# Patient Record
Sex: MALE | Race: WHITE | Employment: FULL TIME | URBAN - METROPOLITAN AREA
[De-identification: names, ages, dates, MRNs, and addresses within clinical notes are randomized per-mention and may not be internally consistent; named-entity substitution may affect disease eponyms.]

---

## 2023-04-19 ENCOUNTER — HOSPITAL ENCOUNTER (OUTPATIENT)
Dept: PHYSICAL THERAPY | Age: 78
Discharge: HOME | End: 2023-04-19
Payer: MEDICARE

## 2023-04-19 PROCEDURE — 97110 THERAPEUTIC EXERCISES: CPT

## 2023-04-19 PROCEDURE — 97162 PT EVAL MOD COMPLEX 30 MIN: CPT

## 2023-04-19 PROCEDURE — 97140 MANUAL THERAPY 1/> REGIONS: CPT

## 2023-04-19 NOTE — PROGRESS NOTES
PT INITIAL EVALUATION NOTE 2-15    Patient Name: Farooq Kapadia  Date:2023  : 1945  [x]  Patient  Verified  Payor: BLUE CROSS / Plan: [employer] [facility] [place] / Product Type: PPO /    In time: 2:35 p  Out time:3:40 p  Total Treatment Time (min): 65  Visit #: 1     Treatment Area: Neck pain [M54.2]  Other low back pain [M54.59]    SUBJECTIVE  Pain Level (0-10 scale): Current- 3, Best- 0, Worst- 8    Any medication changes, allergies to medications, adverse drug reactions, diagnosis change, or new procedure performed?: [] No    [x] Yes (see summary sheet for update)  Subjective:     Chief complaint: Neck pain radiating to the shoulder blade B. Pain began in late January on a trip to [facility]. He reports that he sat in lounge chairs and he felt like he strained his neck trying to have conversations while holding his head up. Pain radiates to the shoulder/ mid scapular region. It is an aching and sharp pain and has stayed the same. He is L handed. Aggravating factors: reaching up, turning the ehad   Easing factors: Icy-hot and IB  Imaging/tests: none performed   Numbness/tingling: denies     PLOF: Kayaking and swimming without pain    Mechanism of Injury: Overuse   Previous Treatment/Compliance: Previous PT for L ankle  PMHx/Surgical Hx: macular degeneration and legally blind ***  Work Hx: Not currently working- enjoys walking, play basketball, ride his bike, swim, kayak   Living Situation: Stairs at home, pulling on handrails hurts his shoulder, aide 3x/week   Pt Goals: \"Swim, bike, kayak again. \"   Barriers: None noted   Motivation: Motivated   Substance use: None noted    Cognition: A & O x 4        OBJECTIVE/EXAMINATION  Posture:  mod forward head/rounded shoulders    Palpation: No TTP noted  Joint Mobility:   Cervical- min hypomobile   Thoracic- NT      Cervical AROM: Rests at 5 degrees flexion        R  L    Flexion    To chest         Extension   40        Side Bending   25  25, p!  To L          Rotation   25%, p! 50%, p!      Upper Extremity AROM:       R  L         Flexion    160  150  Abduction   145  155  External Rotation  WNL  WNL  Internal Rotation  WNL  WNL, P!    MMT:      R  L  Shoulder flexion  5  5  Shoulder abduction   5  5  Shoulder IR   5  4+  Shoulder ER   4+  4+    Sensation: Intact and equal bilaterally       Special Tests:    Cervical Compression: negative    Cervical Distraction: positive relief    Vertebral Artery test: NT   Spurling's test: negative        Modality rationale: decrease pain and increase tissue extensibility to improve the patients ability to turn his head, reach, perform ADL's    Min Type Additional Details    [] Estim: []Att   []Unatt        []TENS instruct                  []IFC  []Premod   []NMES                     []Other:  []w/US   []w/ice   []w/heat  Position:  Location:    []  Traction: [] Cervical       []Lumbar                       [] Prone          []Supine                       []Intermittent   []Continuous Lbs:  [] before manual  [] after manual  []w/heat    []  Ultrasound: []Continuous   [] Pulsed at:                            []1MHz   []3MHz Location:  W/cm2:    []  Paraffin         Location:  []w/heat   10 []  Ice     [x]  Heat- 2 towels  []  Ice massage Position: supine  Location: cervical     []  Laser  []  Other: Position:  Location:    []  Vasopneumatic Device Pressure:       [] lo [] med [] hi   Temperature:    [x] Skin assessment post-treatment:  [x]intact []redness- no adverse reaction    []redness - adverse reaction:     *** min Therapeutic Exercise:  [] See flow sheet :   Rationale: {BSHSI IMMOTION THER EX:84001:a} to improve the patients ability to ***    8 min Manual Therapy:  ***   Rationale: {BSHSI IMMOTION MANUAL THERAPY:29522:a}  to improve the patients ability to ***            With   [] TE   [] TA   [] Neuro   [] SC   [] other: Patient Education: [x] Review HEP    [] Progressed/Changed HEP based on:   [] positioning   [] body mechanics   [] transfers   [] heat/ice application    [] other:        Other Objective/Functional Measures: FOTO Functional Measure: 55/100                  Pain Level (0-10 scale) post treatment: ***      ASSESSMENT:      [x]  See Plan of Dyan Billy 27, DPT 4/19/2023

## 2023-04-20 NOTE — THERAPY EVALUATION
Physical Therapy at ,   a part of  [facility]  [place]  [facility] [place]  Phone: [phone]  Fax: [phone]    Plan of Care/Statement of Necessity for Physical Therapy Services  2-15    Patient name: Shanel Saleh  : 1945  Provider#: [id number]  Referral source: Referred, Self, MD      Medical/Treatment Diagnosis: Neck pain [M54.2]  Other low back pain [M54.59]     Prior Hospitalization: see medical history     Comorbidities: ***  Prior Level of Function: Kayaking and swimming without pain    Medications: Verified on Patient Summary List  Start of Care: 23      Onset Date:    The Plan of Care and following information is based on the information from the initial evaluation. Assessment/ key information: Patient is a pleasant 68year old male, accompanied by aide, presenting with cervical pain. Current symptoms limit functional ability to reach overhead when dressing, turn his head while grooming, or swim for general wellness and exercise. Marked deficits include cervical and B shoulder AROM restriction, cervical vertebrae hypomobility, and modest upper quarter hypertonicity. Patient will benefit from skilled PT to address all previously listed deficits. Evaluation Complexity History {PT OP History :62311}; Examination {PT OP Examination :83330} ; Presentation {PT OP Presentation :22147} ; Clinical Decision Making {PT OP Decision Making :87566}  Overall Complexity Rating: {PT OP Complexity:65356}    Problem List: {BSHSI IP PROBLEM LIST:97695:a}   Treatment Plan may include any combination of the following: {Outpt PT Treatment Plan:51541:a}  Patient / Family readiness to learn indicated by: {Outpt PT Patient Family Readiness to Learn:99219:a}  Persons(s) to be included in education: {IM Persons Education:73427}  Barriers to Learning/Limitations: {barriers to learning/limitations:24150}  Patient Goal (s): ***  Patient Self Reported Health Status: {Outpt PT Rehabilitation Potential:47214}  Rehabilitation Potential: {Outpt PT Rehabilitation Potential:11850}    Short Term Goals: To be accomplished in *** {BSHSI OPWEEKS/TREATMENTS:19956}:  ***  Long Term Goals: To be accomplished in *** {BSHSI OP WEEKS/TREATMENTS:19956}:  ***  Frequency / Duration: Patient to be seen *** times per week for *** {BSHSI OP WEEKS/TREATMENTS:19956}. Patient/ Caregiver education and instruction: {Outpt PT patient caregiver ed.:97769:a}    [x]  Plan of care has been reviewed with PTA        Certification Period: ***  Joceline Diaz DPT 4/20/2023     ________________________________________________________________________    I certify that the above Therapy Services are being furnished while the patient is under my care. I agree with the treatment plan and certify that this therapy is necessary.     [de-identified] Signature:____________________  Date:____________Time: _________         Martínez Castillo MD

## 2023-04-24 ENCOUNTER — HOSPITAL ENCOUNTER (OUTPATIENT)
Dept: PHYSICAL THERAPY | Age: 78
Discharge: HOME | End: 2023-04-24
Payer: MEDICARE

## 2023-04-24 PROCEDURE — 97140 MANUAL THERAPY 1/> REGIONS: CPT

## 2023-04-24 PROCEDURE — 97110 THERAPEUTIC EXERCISES: CPT

## 2023-04-24 NOTE — PROGRESS NOTES
PT DAILY TREATMENT NOTE - [facility] 2-15    Patient Name: Trip Bell  Date:2023  : 1945  [x]  Patient  Verified  Payor: Danelle Encarnacion / Plan: [place] & [place] / Product Type: Medicare /    In time: 1:37 p  Out time: 2:40 p  Total Treatment Time (min): 63  Total Timed Codes (min): 53  1:1 Treatment Time ( [address] only): 53   Visit #:  2    Treatment Area: Neck pain [M54.2]  Other low back pain [M54.59]    SUBJECTIVE  Pain Level (0-10 scale): 2  Any medication changes, allergies to medications, adverse drug reactions, diagnosis change, or new procedure performed?: [x] No    [] Yes (see summary sheet for update)  Subjective functional status/changes:   [] No changes reported  Patient reports that he felt much better after last time.   He liked the massage and the rowing exercise the most.      OBJECTIVE    Modality rationale: decrease pain and increase tissue extensibility to improve the patients ability to turn his head, reach, perform ADL's   Min Type Additional Details       [] Estim: []Att   []Unatt    []TENS instruct                  []IFC  []Premod   []NMES                     []Other:  []w/US   []w/ice   []w/heat  Position:  Location:       []  Traction: [] Cervical       []Lumbar                       [] Prone          []Supine                       []Intermittent   []Continuous Lbs:  [] before manual  [] after manual  []w/heat    []  Ultrasound: []Continuous   [] Pulsed                       at: []1MHz   []3MHz Location:  W/cm2:    [] Paraffin         Location:   []w/heat   10 []  Ice     [x]  Heat- 2 towels   []  Ice massage Position:  Location:    []  Laser  []  Other: Position:  Location:      []  Vasopneumatic Device Pressure:       [] lo [] med [] hi   Temperature:      [x] Skin assessment post-treatment:  [x]intact []redness- no adverse reaction    []redness - adverse reaction:     38 min Therapeutic Exercise:  [] See flow sheet :   Rationale: increase ROM and increase strength to improve the patients ability to turn his head, reach, perform ADL's    15 min Manual Therapy: STM B UT/LS, cervical paraspinals, manual distraction and SOR, central and unilateral cervical PA's     Rationale: decrease pain, increase ROM, and increase tissue extensibility to improve the patients ability to turn his head, reach, perform ADL's            With   [] TE   [] TA   [] Neuro   [] SC   [] other: Patient Education: [x] Review HEP    [] Progressed/Changed HEP based on:   [] positioning   [] body mechanics   [] transfers   [] heat/ice application    [] other:      Other Objective/Functional Measures:   UBE without difficulty, patient requesting resistance next session    UT stretch with cues to relax shoulders and isolate motion, addition of overpressure to L  No money exercise with tactile cues to maintain form, tendency to horizontally abduct rather than rotation on R    Min R UT hypertonicity     Pain Level (0-10 scale) post treatment: 1    ASSESSMENT/Changes in Function:   Patient with excellent participation and tolerance for session. No pain throughout session, will do well with progression to tolerance. HEP updated. Patient will continue to benefit from skilled PT services to modify and progress therapeutic interventions, address functional mobility deficits, address ROM deficits, address strength deficits, analyze and address soft tissue restrictions, analyze and cue movement patterns, analyze and modify body mechanics/ergonomics, assess and modify postural abnormalities, and instruct in home and community integration to attain remaining goals. [x]  See Plan of Care  []  See progress note/recertification  []  See Discharge Summary         Progress towards goals / Updated goals:  Consistent with HEP at initial follow up visit, assistance from aide noted.      PLAN  [x]  Upgrade activities as tolerated     [x]  Continue plan of care  []  Update interventions per flow sheet       []  Discharge due to:_  [] Other:_      Yudi Saenz, DPT 4/24/2023

## 2023-05-01 ENCOUNTER — APPOINTMENT (OUTPATIENT)
Facility: HOSPITAL | Age: 78
End: 2023-05-01
Payer: MEDICARE

## 2023-05-03 ENCOUNTER — HOSPITAL ENCOUNTER (OUTPATIENT)
Dept: PHYSICAL THERAPY | Age: 78
Discharge: HOME | End: 2023-05-03
Payer: COMMERCIAL

## 2023-05-03 PROCEDURE — 9990 CHARGE CONVERSION

## 2023-05-03 PROCEDURE — 97140 MANUAL THERAPY 1/> REGIONS: CPT

## 2023-05-03 PROCEDURE — 97110 THERAPEUTIC EXERCISES: CPT

## 2023-05-10 ENCOUNTER — HOSPITAL ENCOUNTER (OUTPATIENT)
Facility: HOSPITAL | Age: 78
Setting detail: RECURRING SERIES
Discharge: HOME | End: 2023-05-13
Payer: MEDICARE

## 2023-05-10 ENCOUNTER — APPOINTMENT (OUTPATIENT)
Dept: PHYSICAL THERAPY | Age: 78
End: 2023-05-10
Payer: COMMERCIAL

## 2023-05-10 PROCEDURE — 97140 MANUAL THERAPY 1/> REGIONS: CPT

## 2023-05-10 PROCEDURE — 97110 THERAPEUTIC EXERCISES: CPT

## 2023-05-10 NOTE — PROGRESS NOTES
PHYSICAL THERAPY - MEDICARE DAILY TREATMENT NOTE (updated 3/23)      Date: 5/10/2023          Patient Name:  Scarlet Sigala :  1945   Medical   Diagnosis:  Cervicalgia [M54.2]  Other low back pain [M54.59] Treatment Diagnosis:  M54.2  NECK PAIN    Referral Source:  Referral, Self Insurance:   Payor: MEDICARE / Plan: MEDICARE PART A AND B / Product Type: *No Product type* /                     Patient  verified yes     Visit #   Current  / Total 4 24   Time   In / Out 1:00 pm 2:00 pm   Total Treatment Time 60   Total Timed Codes 45   1:1 Treatment Time 39      [facility] Totals Reminder:  bill using total billable   min of TIMED therapeutic procedures and modalities. 8-22 min = 1 unit; 23-37 min = 2 units; 38-52 min = 3 units; 53-67 min = 4 units; 68-82 min = 5 units            SUBJECTIVE    Pain Level (0-10 scale): 0    Any medication changes, allergies to medications, adverse drug reactions, diagnosis change, or new procedure performed?: [x] No    [] Yes (see summary sheet for update)  Medications: Verified on Patient Summary List    Subjective functional status/changes:     Pt reports that over the weekend he was cleaning his pool and when done he swam 1 lap across and notice that both of his feet had become numb. Pt states that his feet are still numb today. Pt also reports that he has started to notice that his L hand has also become numb recently but cannot figure out the mechanism of injury. OBJECTIVE      Therapeutic Procedures: Tx Min Billable or 1:1 Min (if diff from Tx Min) Procedure, Rationale, Specifics   30 30 [id number] Therapeutic Exercise (timed):  increase ROM, strength, coordination, balance, and proprioception to improve patient's ability to progress to PLOF and address remaining functional goals.  (see flow sheet as applicable)     Details if applicable:     15 15 84415 Manual Therapy (timed):  decrease pain, increase ROM, increase tissue extensibility, and decrease trigger points to

## 2023-05-17 ENCOUNTER — APPOINTMENT (OUTPATIENT)
Dept: PHYSICAL THERAPY | Age: 78
End: 2023-05-17
Payer: COMMERCIAL

## 2023-05-17 ENCOUNTER — HOSPITAL ENCOUNTER (OUTPATIENT)
Facility: HOSPITAL | Age: 78
Setting detail: RECURRING SERIES
Discharge: HOME | End: 2023-05-20
Payer: MEDICARE

## 2023-05-17 PROCEDURE — 97110 THERAPEUTIC EXERCISES: CPT

## 2023-05-17 NOTE — PROGRESS NOTES
goals.  The manual therapy interventions were performed at a separate and distinct time from the therapeutic activities interventions . (see flow sheet as applicable)     Details if applicable:  STM B UT/LS, cervical paraspinals, manual distraction and SOR, central and unilateral cervical PA's         Details if applicable:           Details if applicable:            Details if applicable:     40 40    Total Total         Modalities Rationale:     decrease pain and increase tissue extensibility to improve patient's ability to progress to PLOF and address remaining functional goals. min [] Estim Unattended,             type/location:       []  w/ice    []  w/heat        min [] Estim Attended,             type/location:       []  w/ice   []  w/heat         []  w/US   []  TENS insruct            min []  Mechanical Traction,        type/lbs:        []  pro      []  sup           []  int       []  cont            []  before manual           []  after manual     min []  Ultrasound,         settings/location:     15 min  unbilled []  Ice     [x]  Heat            location/position: C-Spine/Supine         min []  Vasopneumatic Device,      press/temp:   pre-treatment girth :    post-treatment girth :    measured at (landmark       location) : If using vaso (only need to measure limb vaso being performed on)        min []  Other:      Skin assessment post-treatment (if applicable):    [x]  intact    []  redness- no adverse reaction                 []redness - adverse reaction:          [x]  Patient Education billed concurrently with other procedures   [x] Review HEP    [] Progressed/Changed HEP, detail:    [] Other detail:         Other Objective/Functional Measures  UBE lv 2 without difficulty  Pt continues to require min-mod verbal cuing with UT stretch and resisted No Moneys in order to perform correctly and isolate motion.    No increase in pain or symptoms noted throughout session      Pain Level at end of session

## 2023-05-24 ENCOUNTER — HOSPITAL ENCOUNTER (OUTPATIENT)
Facility: HOSPITAL | Age: 78
Setting detail: RECURRING SERIES
Discharge: HOME | End: 2023-05-27
Payer: MEDICARE

## 2023-05-24 PROCEDURE — 97110 THERAPEUTIC EXERCISES: CPT

## 2023-05-24 NOTE — PROGRESS NOTES
Physical Therapy at [facility],   a part of  [facility]  [address] [facility]  Phone: [phone]  Fax: [phone]  PHYSICAL THERAPY PROGRESS NOTE  Patient Name:  Jim Partida :  1945   Treatment/Medical Diagnosis: Cervicalgia [M54.2]  Other low back pain [M54.59]   Referral Source:  Referral, Self     Date of Initial Visit:  23 Attended Visits:  6 Missed Visits:  0     SUMMARY OF TREATMENT/ASSESSMENT:  At time of reassessment, patient has attended  visits and has met all short term and 2/3 long term goals. He reports significant improvement in cervical pain, able to ride his bike and swim without pain. He demonstrates full pain free cervical AROM. He presents today with new complaints of chronic paresthesia and stiffness in B wrists when walking for over 40 minutes and when riding his bike. Introductory wrist stretches and  strengthening exercises well tolerated. He will do well with continued skilled PT services 1-2x/week for 4- weeks to address new complaints and achieve updated and outstanding goals. CURRENT STATUS    Short Term Goals: To be accomplished in 4 weeks:    Patient will be independent with initial HEP in order to transition to general wellness program. MET  Patient will demonstrate L rotation to 25% restriction or better to ease conversations with aide and grooming tasks. MET  Patient will report worst pain of 6/10 or better to improve sleeping ability. MET    Long Term Goals: To be accomplished in 12 weeks:  1. Patient will report worst pain no greater than 2/10 to increase QOL and tolerance for sleeping through the night. MET  2. Patient will demonstrate full cervical AROM WFL to ease dressing, grooming and overhead reaching into high shelves. MET  3. Patient will demonstrate 5/5 BUE strength to ease household chores.  Progressing toward     Updated Goals:  Patient will verbalize implemented
Specifics   43 [phone] [id number] Therapeutic Exercise (timed):  increase ROM, strength, coordination, balance, and proprioception to improve patient's ability to progress to PLOF and address remaining functional goals. (see flow sheet as applicable)     Details if applicable:     0 0 82739 Manual Therapy (timed):  decrease pain, increase ROM, increase tissue extensibility, and decrease trigger points to improve patient's ability to progress to PLOF and address remaining functional goals. The manual therapy interventions were performed at a separate and distinct time from the therapeutic activities interventions . (see flow sheet as applicable)     Details if applicable:  STM B UT/LS, cervical paraspinals, manual distraction and SOR, central and unilateral cervical PA's         Details if applicable:           Details if applicable:            Details if applicable:     43 43    Total Total         Modalities Rationale:     decrease pain and increase tissue extensibility to improve patient's ability to progress to PLOF and address remaining functional goals. min [] Estim Unattended,             type/location:       []  w/ice    []  w/heat        min [] Estim Attended,             type/location:       []  w/ice   []  w/heat         []  w/US   []  TENS insruct            min []  Mechanical Traction,        type/lbs:        []  pro      []  sup           []  int       []  cont            []  before manual           []  after manual     min []  Ultrasound,         settings/location:     15 min  unbilled []  Ice     [x]  Heat            location/position: C-Spine/Supine         min []  Vasopneumatic Device,      press/temp:   pre-treatment girth :    post-treatment girth :    measured at (landmark       location) :    If using vaso (only need to measure limb vaso being performed on)        min []  Other:      Skin assessment post-treatment (if applicable):    [x]  intact    []  redness- no adverse reaction

## 2025-03-21 ENCOUNTER — ANESTHESIA (OUTPATIENT)
Facility: HOSPITAL | Age: 80
End: 2025-03-21
Payer: MEDICARE

## 2025-03-21 ENCOUNTER — HOSPITAL ENCOUNTER (OUTPATIENT)
Facility: HOSPITAL | Age: 80
Setting detail: OUTPATIENT SURGERY
Discharge: HOME | End: 2025-03-21
Attending: INTERNAL MEDICINE | Admitting: INTERNAL MEDICINE
Payer: MEDICARE

## 2025-03-21 ENCOUNTER — ANESTHESIA EVENT (OUTPATIENT)
Facility: HOSPITAL | Age: 80
End: 2025-03-21
Payer: MEDICARE

## 2025-03-21 VITALS
TEMPERATURE: 98.2 F | DIASTOLIC BLOOD PRESSURE: 70 MMHG | OXYGEN SATURATION: 98 % | RESPIRATION RATE: 15 BRPM | SYSTOLIC BLOOD PRESSURE: 144 MMHG | WEIGHT: 199.08 LBS | HEIGHT: 69 IN | HEART RATE: 63 BPM | BODY MASS INDEX: 29.49 KG/M2

## 2025-03-21 PROCEDURE — 3600007502: Performed by: INTERNAL MEDICINE

## 2025-03-21 PROCEDURE — 3700000001 HC ADD 15 MINUTES (ANESTHESIA): Performed by: INTERNAL MEDICINE

## 2025-03-21 PROCEDURE — 88305 TISSUE EXAM BY PATHOLOGIST: CPT

## 2025-03-21 PROCEDURE — 7100000010 HC PHASE II RECOVERY - FIRST 15 MIN: Performed by: INTERNAL MEDICINE

## 2025-03-21 PROCEDURE — 2709999900 HC NON-CHARGEABLE SUPPLY: Performed by: INTERNAL MEDICINE

## 2025-03-21 PROCEDURE — 7100000011 HC PHASE II RECOVERY - ADDTL 15 MIN: Performed by: INTERNAL MEDICINE

## 2025-03-21 PROCEDURE — 3600007512: Performed by: INTERNAL MEDICINE

## 2025-03-21 PROCEDURE — 6360000002 HC RX W HCPCS: Performed by: NURSE ANESTHETIST, CERTIFIED REGISTERED

## 2025-03-21 PROCEDURE — 3700000000 HC ANESTHESIA ATTENDED CARE: Performed by: INTERNAL MEDICINE

## 2025-03-21 RX ORDER — SODIUM CHLORIDE 0.9 % (FLUSH) 0.9 %
5-40 SYRINGE (ML) INJECTION EVERY 12 HOURS SCHEDULED
Status: DISCONTINUED | OUTPATIENT
Start: 2025-03-21 | End: 2025-03-21 | Stop reason: HOSPADM

## 2025-03-21 RX ORDER — ONDANSETRON 4 MG/1
4 TABLET, ORALLY DISINTEGRATING ORAL EVERY 8 HOURS PRN
Status: DISCONTINUED | OUTPATIENT
Start: 2025-03-21 | End: 2025-03-21 | Stop reason: HOSPADM

## 2025-03-21 RX ORDER — OMEPRAZOLE 40 MG/1
CAPSULE, DELAYED RELEASE ORAL
COMMUNITY
Start: 2025-01-17

## 2025-03-21 RX ORDER — BUSPIRONE HYDROCHLORIDE 15 MG/1
TABLET ORAL
COMMUNITY
Start: 2025-01-24

## 2025-03-21 RX ORDER — SODIUM CHLORIDE 9 MG/ML
25 INJECTION, SOLUTION INTRAVENOUS PRN
Status: DISCONTINUED | OUTPATIENT
Start: 2025-03-21 | End: 2025-03-21 | Stop reason: HOSPADM

## 2025-03-21 RX ORDER — LIDOCAINE HYDROCHLORIDE 20 MG/ML
INJECTION, SOLUTION INTRAVENOUS
Status: DISCONTINUED | OUTPATIENT
Start: 2025-03-21 | End: 2025-03-21 | Stop reason: SDUPTHER

## 2025-03-21 RX ORDER — PROPOFOL 10 MG/ML
INJECTION, EMULSION INTRAVENOUS
Status: DISCONTINUED | OUTPATIENT
Start: 2025-03-21 | End: 2025-03-21 | Stop reason: SDUPTHER

## 2025-03-21 RX ORDER — AZELASTINE HYDROCHLORIDE 137 UG/1
SPRAY, METERED NASAL
COMMUNITY
Start: 2025-01-22

## 2025-03-21 RX ORDER — SODIUM CHLORIDE 0.9 % (FLUSH) 0.9 %
5-40 SYRINGE (ML) INJECTION PRN
Status: DISCONTINUED | OUTPATIENT
Start: 2025-03-21 | End: 2025-03-21 | Stop reason: HOSPADM

## 2025-03-21 RX ORDER — SODIUM CHLORIDE 9 MG/ML
INJECTION, SOLUTION INTRAVENOUS PRN
Status: DISCONTINUED | OUTPATIENT
Start: 2025-03-21 | End: 2025-03-21 | Stop reason: HOSPADM

## 2025-03-21 RX ORDER — TRAZODONE HYDROCHLORIDE 50 MG/1
TABLET ORAL
COMMUNITY
Start: 2025-03-18

## 2025-03-21 RX ORDER — FAMOTIDINE 20 MG/1
TABLET, FILM COATED ORAL
COMMUNITY
Start: 2025-03-18

## 2025-03-21 RX ORDER — ROSUVASTATIN CALCIUM 10 MG/1
TABLET, COATED ORAL
COMMUNITY
Start: 2025-02-26

## 2025-03-21 RX ORDER — ONDANSETRON 2 MG/ML
4 INJECTION INTRAMUSCULAR; INTRAVENOUS EVERY 6 HOURS PRN
Status: DISCONTINUED | OUTPATIENT
Start: 2025-03-21 | End: 2025-03-21 | Stop reason: HOSPADM

## 2025-03-21 RX ADMIN — LIDOCAINE HYDROCHLORIDE 100 MG: 20 INJECTION, SOLUTION INTRAVENOUS at 14:47

## 2025-03-21 RX ADMIN — PROPOFOL 80 MG: 10 INJECTION, EMULSION INTRAVENOUS at 14:49

## 2025-03-21 RX ADMIN — PROPOFOL 120 MCG/KG/MIN: 10 INJECTION, EMULSION INTRAVENOUS at 14:50

## 2025-03-21 ASSESSMENT — PAIN SCALES - GENERAL
PAINLEVEL_OUTOF10: 0

## 2025-03-21 ASSESSMENT — PAIN - FUNCTIONAL ASSESSMENT: PAIN_FUNCTIONAL_ASSESSMENT: NONE - DENIES PAIN

## 2025-03-21 NOTE — ANESTHESIA POSTPROCEDURE EVALUATION
Department of Anesthesiology  Postprocedure Note    Patient: Moncho Salvador  MRN: [id number]  YOB: 1945  Date of evaluation: 3/21/2025    Procedure Summary       Date: 03/21/25 Room / Location: [facility] ENDO 03 / [facility] ENDOSCOPY    Anesthesia Start: 1446 Anesthesia Stop: 1501    Procedures:       ESOPHAGOGASTRODUODENOSCOPY (Upper GI Region)      ESOPHAGOGASTRODUODENOSCOPY BIOPSY (Upper GI Region)      ESOPHAGOGASTRODUODENOSCOPY DILATION BALLOON (Upper GI Region) Diagnosis:       Esophageal dysphagia      Fatty liver      Abnormal liver function test      (Esophageal dysphagia [R13.19])      (Fatty liver [K76.0])      (Abnormal liver function test [R79.89])    Surgeons: Lucero Bui MD Responsible Provider: Mina Robles MD    Anesthesia Type: MAC ASA Status: 3            Anesthesia Type: No value filed.    Darlyn Phase I: Darlyn Score: 10    Darlyn Phase II: Darlyn Score: 8    Anesthesia Post Evaluation    Patient location during evaluation: PACU  Patient participation: complete - patient participated  Level of consciousness: awake  Pain score: 0  Airway patency: patent  Nausea & Vomiting: no nausea and no vomiting  Cardiovascular status: blood pressure returned to baseline  Respiratory status: acceptable  Hydration status: euvolemic  Pain management: adequate    No notable events documented.

## 2025-03-21 NOTE — PROGRESS NOTES
CRE balloon dilatation of the esophagus   18 mm Balloon inflated to 3 ATMs and held for 60 seconds.  19 mm Balloon inflated to 4.5 ATMs and held for 60 seconds.  20 mm Balloon inflated to 6 ATMs and held for 60 seconds. @ 1457    No subcutaneous crepitus of the chest or cervical region was noted post dilatation.      Report from Edilberto MALDONADO see anesthesia record. Abdomen remains soft, non-tender; pt denies pain. Scope pre-cleaned at bedside by Kalee SANTIAGO/ Zelda. Report given to Magdalene SANTIAGO in Recovery @ 7300.

## 2025-03-21 NOTE — DISCHARGE INSTRUCTIONS
.                       [facility]  [facility]  Lucero Camacho MD  [phone]      March 21, 2025    Moncho Salvador  YOB: 1945    EGD DISCHARGE INSTRUCTIONS    If there is redness at IV site you should apply warm compress to area.  If redness or soreness persist contact Dr. Camacho's or your primary care doctor.    There may be a slight amount of blood passed from the rectum.  Gaseous discomfort may develop, but walking, belching will help relieve this.  You may not operate a vehicle for 12 hours  You may not operate machinery or dangerous appliances for rest of today  You may not drink alcoholic beverages for 12 hours  Avoid making any critical decisions for 24 hours    DIET:  You may resume your normal diet, but some patients find that heavy or large meals may lead to indigestion or vomiting.  I suggest a light meal as first food intake.    MEDICATIONS:  The use of some over-the-counter pain medication may lead to bleeding after colon biopsies or polyp removal.  Tylenol (also called acetaminophen) is safe to take even if you have had colonoscopy with polyp removal.   Remember that Tylenol (also called acetaminophen) is safe to take after colonoscopy even if you have had biopsies or polyps removed.    ACTIVITY:  You may resume your normal household activities, but it is recommended that you spend the remainder of the day resting -  avoid any strenuous activity.    CALL DR. CAMACHO'S OFFICE IF:  Increasing pain, nausea, vomiting  Abdominal distension (swelling)  Significant new or increased bleeding (oral or rectal)  Fever/Chills  Chest pain/shortness of breath                       Additional instructions:   EGD/GI upper and to be with lower esophageal ring dilated to 20 mm and moderately disrupted with a balloon catheter  Benign-appearing 1.5 cm proximal gastric polyp with surface mucosal biopsies obtained  Diffuse benign-appearing edematous change in the

## 2025-03-21 NOTE — OP NOTE
.                         [facility]  Formerly [facility]  Lucero Bui MD  [phone]      2025    Esophagogastroduodenoscopy (EGD) Procedure Note  Moncho Salvador  : 1945  [facility] Medical Record Number: [id number]      Indications:   Esophageal dysphagia, minimal GERD symptoms  Referring Physician:  Hakeem Proctor MD  Anesthesia/Sedation: Monitored anesthesia care, see separate note  Endoscopist:  Lucero Bui MD   Complications:  None  Estimated Blood Loss:  None    Permit:  The indications, risks, benefits and alternatives were reviewed with the patient or their decision maker who was provided an opportunity to ask questions and all questions were answered.  The specific risks of esophagogastroduodenoscopy with conscious sedation were reviewed, including but not limited to anesthetic complication, bleeding, adverse drug reaction, missed lesion, infection, IV site reactions, and intestinal perforation which would lead to the need for surgical repair.  Alternatives to EGD including radiographic imaging, observation without testing, or laboratory testing were reviewed as well as the limitations of those alternatives discussed.  After considering the options and having all their questions answered, the patient or their decision maker provided both verbal and written consent to proceed.       Procedure in Detail:  After obtaining informed consent, positioning of the patient in the left lateral decubitus position, and conduction of a pre-procedure pause or \"time out\" the endoscope was introduced into the mouth and advanced to the duodenum.  A careful inspection was made, and findings or interventions are described below.    Findings:   Esophagus-benign-appearing lower esophageal ring at the level of the GE junction successfully dilated moderate disruption with a through-the-scope balloon catheter to 20 mm, otherwise regular appearing

## 2025-03-21 NOTE — ANESTHESIA PRE PROCEDURE
Department of Anesthesiology  Preprocedure Note       Name:  Moncho Salvador   Age:  79 y.o.  :  1945                                          MRN:  [id number]         Date:  3/21/2025      Surgeon: Surgeon(s):  Lucero Bui MD    Procedure: Procedure(s):  ESOPHAGOGASTRODUODENOSCOPY    Medications prior to admission:   Prior to Admission medications    Medication Sig Start Date End Date Taking? Authorizing Provider   azelastine (ASTEPRO) 137 MCG/SPRAY nasal spray  25  Yes Nino Salamanca MD   busPIRone (BUSPAR) 15 MG tablet  25  Yes Nino Salamanca MD   famotidine (PEPCID) 20 MG tablet  3/18/25  Yes Nino Salamanca MD   omeprazole (PRILOSEC) 40 MG delayed release capsule  25  Yes Nino Salamanca MD   rosuvastatin (CRESTOR) 10 MG tablet  25  Yes Nino Salamanca MD   traZODone (DESYREL) 50 MG tablet  3/18/25  Yes Nino Salamanca MD   Multiple Vitamin (MULTIVITAMIN ADULT PO) Take by mouth   Yes Nino Salamanca MD   Fexofenadine HCl (ALLEGRA ALLERGY PO) Take by mouth   Yes Nino Salamanca MD   Omega-3 Fatty Acids (FISH OIL) 300 MG CAPS Take by mouth   Yes ProviderNino MD       Current medications:    No current facility-administered medications for this encounter.       Allergies:  No Known Allergies    Problem List:  There is no problem list on file for this patient.      Past Medical History:  No past medical history on file.    Past Surgical History:  No past surgical history on file.    Social History:    Social History     Tobacco Use   • Smoking status: Not on file   • Smokeless tobacco: Not on file   Substance Use Topics   • Alcohol use: Not on file                                Counseling given: Not Answered      Vital Signs (Current): There were no vitals filed for this visit.                                           BP Readings from Last 3 Encounters:   No data found for BP       NPO Status: Time of last liquid consumption:

## 2025-03-21 NOTE — H&P
.Pre-Endoscopy H&P Update  Chief complaint/HPI/ROS:  The indication for the procedure, the patient's history and the patient's current medications are reviewed prior to the procedure and that data is reported on the H&P to which this document is attached.  Any significant complaints with regard to organ systems will be noted, and if not mentioned then a review of systems is not contributory.  No past medical history on file.   No past surgical history on file.  Social   Social History     Tobacco Use    Smoking status: Not on file    Smokeless tobacco: Not on file   Substance Use Topics    Alcohol use: Not on file      No family history on file.   Not on File   None       PHYSICAL EXAM:  The patient is examined immediately prior to the procedure.  There were no vitals filed for this visit.  Gen: Appears comfortable, no distress.  Pulm: comfortable respirations with no abnormal audible breath sounds  HEART: well perfused, no abnormal audible heart sounds  GI: abdomen flat.    PLAN:  Informed consent discussion held, patient afforded an opportunity to ask questions and all questions answered.  After being advised of the risks, benefits, and alternatives, the patient requested that we proceed and indicated so on a written consent form.      Will proceed with procedure as planned.  Lucero Bui MD

## (undated) DEVICE — SOLIDIFIER FLD 2OZ 1500CC N DISINF IN BTL DISP SAFESORB

## (undated) DEVICE — CANNULA CUSH AD W/ 14FT TBG

## (undated) DEVICE — 1200 GUARD II KIT W/5MM TUBE W/O VAC TUBE: Brand: GUARDIAN

## (undated) DEVICE — IV STRT KT 3282] LSL INDUSTRIES INC]

## (undated) DEVICE — CATHETER IV 22GA L1IN OD0.8382-0.9144MM ID0.6096-0.6858MM 382523

## (undated) DEVICE — ELECTRODE,RADIOTRANSLUCENT,FOAM,3PK: Brand: MEDLINE

## (undated) DEVICE — SYRINGE MEDICAL 3ML CLEAR PLASTIC STANDARD NON CONTROL LUERLOCK TIP DISPOSABLE

## (undated) DEVICE — SET ADMIN 16ML TBNG L100IN 2 Y INJ SITE IV PIGGY BK DISP (ORDER IN MULIPLES OF 48)

## (undated) DEVICE — KIT COLON W/ 1.1OZ LUB AND 2 END

## (undated) DEVICE — BLUNTFILL WITH FILTER: Brand: MONOJECT

## (undated) DEVICE — SYRINGE MED 5ML STD CLR PLAS LUERLOCK TIP N CTRL DISP

## (undated) DEVICE — BLUNTFILL: Brand: MONOJECT

## (undated) DEVICE — BITEBLOCK ENDOSCP 60FR MAXI WHT POLYETH STURDY W/ VELC WVN